# Patient Record
Sex: MALE | Race: WHITE
[De-identification: names, ages, dates, MRNs, and addresses within clinical notes are randomized per-mention and may not be internally consistent; named-entity substitution may affect disease eponyms.]

---

## 2018-05-02 ENCOUNTER — HOSPITAL ENCOUNTER (OUTPATIENT)
Dept: HOSPITAL 17 - CPRE | Age: 53
End: 2018-05-02
Attending: SPECIALIST
Payer: COMMERCIAL

## 2018-05-02 DIAGNOSIS — Z01.812: ICD-10-CM

## 2018-05-02 DIAGNOSIS — Z01.810: Primary | ICD-10-CM

## 2018-05-02 DIAGNOSIS — R94.31: ICD-10-CM

## 2018-05-02 LAB
BASOPHILS # BLD AUTO: 0 TH/MM3 (ref 0–0.2)
BASOPHILS NFR BLD: 0.4 % (ref 0–2)
EOSINOPHIL # BLD: 0.1 TH/MM3 (ref 0–0.4)
EOSINOPHIL NFR BLD: 1.7 % (ref 0–4)
ERYTHROCYTE [DISTWIDTH] IN BLOOD BY AUTOMATED COUNT: 12.6 % (ref 11.6–17.2)
HCT VFR BLD CALC: 46 % (ref 39–51)
HGB BLD-MCNC: 16.1 GM/DL (ref 13–17)
LYMPHOCYTES # BLD AUTO: 1.5 TH/MM3 (ref 1–4.8)
LYMPHOCYTES NFR BLD AUTO: 23.5 % (ref 9–44)
MCH RBC QN AUTO: 32.1 PG (ref 27–34)
MCHC RBC AUTO-ENTMCNC: 35.1 % (ref 32–36)
MCV RBC AUTO: 91.4 FL (ref 80–100)
MONOCYTE #: 0.6 TH/MM3 (ref 0–0.9)
MONOCYTES NFR BLD: 9.8 % (ref 0–8)
NEUTROPHILS # BLD AUTO: 4.2 TH/MM3 (ref 1.8–7.7)
NEUTROPHILS NFR BLD AUTO: 64.6 % (ref 16–70)
PLATELET # BLD: 258 TH/MM3 (ref 150–450)
PMV BLD AUTO: 7.8 FL (ref 7–11)
RBC # BLD AUTO: 5.04 MIL/MM3 (ref 4.5–5.9)
WBC # BLD AUTO: 6.6 TH/MM3 (ref 4–11)

## 2018-05-02 PROCEDURE — 36415 COLL VENOUS BLD VENIPUNCTURE: CPT

## 2018-05-02 PROCEDURE — 93005 ELECTROCARDIOGRAM TRACING: CPT

## 2018-05-02 PROCEDURE — 85025 COMPLETE CBC W/AUTO DIFF WBC: CPT

## 2018-05-02 NOTE — MH
cc:

Faisal Nava MD

****

 

 

DATE OF ADMISSION:

05/02/2018

 

HISTORY OF PRESENT ILLNESS:

A 52-year-old gentleman with nasal obstruction and chronic sinusitis, 

for nasal sinus surgery.

 

PAST MEDICAL HISTORY:

Unremarkable.

 

PAST SURGICAL HISTORY:

Unremarkable.

 

REVIEW OF SYSTEMS

Unremarkable.

 

FAMILY HISTORY:

Unremarkable.

 

SOCIAL HISTORY:

Unremarkable.

 

PHYSICAL EXAMINATION:

GENERAL:  Well-appearing patient, no acute distress noted.

HEENT:  Reveals septal deviation, turbinate hypertrophy, copious clear

secretions.

LUNGS:  Clear.

HEART:  Regular rate and rhythm.

ABDOMEN:  Soft and nontender.

EXTREMITIES:  Without cyanosis, clubbing or edema.

NEUROLOGIC:  Alert, oriented, nonfocal neurologic exam.

 

IMPRESSION:

A patient with chronic sinusitis and nasal obstruction, for nasal 

sinus surgery.  Instructed _____  surgery and possible complications 

including anesthetic complication, cardiac difficulty, pulmonary 

difficulty, stroke, or even death, surgical complication, bleeding, 

infection.  In addition, injury to orbit including blindness and 

diplopia, injury to brain, including CSF leak, meningitis, brain 

abscess or even death.  The patient appeared to agree, accept and 

understand the above mentioned risks and benefits.  We will therefore 

proceed with surgery.

 

 

__________________________________

MD ANDREW Luke/SB

D: 05/02/2018, 04:49 PM

T: 05/02/2018, 05:02 PM

Visit #: [id number]

Job #: [id number]

## 2018-05-03 ENCOUNTER — HOSPITAL ENCOUNTER (OUTPATIENT)
Dept: HOSPITAL 17 - HSDC | Age: 53
Discharge: HOME | End: 2018-05-03
Attending: SPECIALIST
Payer: COMMERCIAL

## 2018-05-03 VITALS
SYSTOLIC BLOOD PRESSURE: 110 MMHG | TEMPERATURE: 98 F | OXYGEN SATURATION: 98 % | HEART RATE: 60 BPM | DIASTOLIC BLOOD PRESSURE: 69 MMHG | RESPIRATION RATE: 18 BRPM

## 2018-05-03 VITALS — HEIGHT: 73 IN | WEIGHT: 221.56 LBS | BODY MASS INDEX: 29.36 KG/M2

## 2018-05-03 DIAGNOSIS — J34.3: ICD-10-CM

## 2018-05-03 DIAGNOSIS — J34.2: Primary | ICD-10-CM

## 2018-05-03 DIAGNOSIS — J32.9: ICD-10-CM

## 2018-05-03 PROCEDURE — 30520 REPAIR OF NASAL SEPTUM: CPT

## 2018-05-03 PROCEDURE — 30140 RESECT INFERIOR TURBINATE: CPT

## 2018-05-03 PROCEDURE — 31253 NSL/SINS NDSC TOTAL: CPT

## 2018-05-03 PROCEDURE — 00160 ANES PX NOSE&SINUS NOS: CPT

## 2018-05-03 PROCEDURE — 31267 ENDOSCOPY MAXILLARY SINUS: CPT

## 2018-05-03 NOTE — EKG
Date Performed: 05/02/2018       Time Performed: 14:48:54

 

PTAGE:      52 years

 

EKG:      Sinus rhythm 

 

 WITH FIRST DEGREE AV BLOCK ABNORMAL QRS-T ANGLE ABNORMAL ECG 

 

NO PREVIOUS TRACING            

 

DOCTOR:   Atiya Tejada  Interpretating Date/Time  05/03/2018 13:59:23

## 2018-05-03 NOTE — MP
cc:

Faisal Nava MD

****

 

 

DATE OF OPERATION:

05/03/2018

 

DATE OF OPERATION:

05/03/2018.

 

PREOPERATIVE DIAGNOSIS:

Nasal obstruction and chronic sinusitis.

 

PROCEDURE:

Open septal reconstruction with bilateral endoscopic sinusotomies 

including left frontal endoscopic sinusotomy, right frontal endoscopic

sinusotomy, left endoscopic anterior-posterior ethmoidectomy, right 

endoscopic anterior-posterior ethmoidectomy, left endoscopic maxillary

with removal of tissue, right endoscopic maxillary antrostomy with 

removal of tissue, left and right inferior turbinectomy, submucous 

resection.

 

ANESTHESIA:

General anesthesia.

 

ESTIMATED BLOOD LOSS:

50 mL.

 

COMPLICATIONS:

No complications.

 

OPERATING SURGEON:

Dr. Nava

 

OPERATION:

Prepped and draped in usual fashion.  1% Xylocaine with 1:100,000 

epinephrine injected into the nasal septum, inferior turbinates, 

middle meatus bilaterally; 1:1000 adrenaline soaked pledgets were 

placed and then removed.

 

Once this was achieved, a mucoperichondrial incision was made on the 

left side of the nose, a mucoperichondrial flap elevated.  A 

significant amount of bone and cartilage were removed to improve nasal

airway and reduce nasal fracture, mucoperichondrial flap 

reapproximated.  Under endoscopic visualization, natural antrostomy 

identified on the left side and mucopurulent material suctioned from 

it, polypoid tissue micro-debrided from the antrostomy and 

anterior-posterior ethmoidectomy and frontal sinus recess dissection 

performed on that side as well.  Once this was achieved, Telfa splint 

was placed in the middle meatus on this side.

 

Attention was turned to the opposite side, which is the right side, 

whereby significant polypoid tissue was removed and the 

anterior-posterior ethmoidectomy performed with microdebrider, natural

antrostomy with removal of tissue as well as significant frontal sinus

recess dissection.  Once this was achieved, Telfa splint placed.

 

After completion of bilateral ethmoid, frontal sinus recess dissection

and natural antrostomy with removal of tissue and the Telfa sponge 

placed in both middle meatus.  Using the Coblator probe with power 

level 4, significant removal/reduction of inferior turbinates 

bilaterally performed submucosally with mucosal sparing.

 

No active bleeding noted.  Mild NasoPore dressing placed.  The patient

tolerated the procedure well.

 

 

__________________________________

MD ANDREW Luke/ABDIAZIZ

D: 05/03/2018, 10:53 AM

T: 05/03/2018, 11:26 AM

Visit #: [id number]

Job #: [id number]